# Patient Record
Sex: MALE | Race: BLACK OR AFRICAN AMERICAN | Employment: UNEMPLOYED | URBAN - METROPOLITAN AREA
[De-identification: names, ages, dates, MRNs, and addresses within clinical notes are randomized per-mention and may not be internally consistent; named-entity substitution may affect disease eponyms.]

---

## 2018-05-25 ENCOUNTER — HOSPITAL ENCOUNTER (INPATIENT)
Age: 18
LOS: 3 days | Discharge: HOME | DRG: 881 | End: 2018-05-29
Attending: EMERGENCY MEDICINE | Admitting: PSYCHIATRY & NEUROLOGY
Payer: COMMERCIAL

## 2018-05-25 DIAGNOSIS — F32.A DEPRESSION, UNSPECIFIED DEPRESSION TYPE: Primary | ICD-10-CM

## 2018-05-25 DIAGNOSIS — R45.851 SUICIDAL IDEATION: ICD-10-CM

## 2018-05-25 PROCEDURE — 80307 DRUG TEST PRSMV CHEM ANLYZR: CPT | Performed by: EMERGENCY MEDICINE

## 2018-05-25 PROCEDURE — 99283 EMERGENCY DEPT VISIT LOW MDM: CPT

## 2018-05-25 PROCEDURE — 99282 EMERGENCY DEPT VISIT SF MDM: CPT

## 2018-05-25 PROCEDURE — 85025 COMPLETE CBC W/AUTO DIFF WBC: CPT | Performed by: EMERGENCY MEDICINE

## 2018-05-25 PROCEDURE — 80048 BASIC METABOLIC PNL TOTAL CA: CPT | Performed by: EMERGENCY MEDICINE

## 2018-05-25 NOTE — IP AVS SNAPSHOT
Summary of Care Report The Summary of Care report has been created to help improve care coordination. Users with access to ASCENDANT MDX or [employer]-e[employer] Northeast (Web-based application) may access additional patient information including the Discharge Summary. If you are not currently a SGN (Social Gaming Network) [employer] Northeast user and need more information, please call the number listed below in the [place] [address] section and ask to be connected with Medical Records. Facility Information Name Address Phone [place] [id number] [facility] [address] [phone] Patient Information Patient Name Sex ETHAN Cook ([id number]) Male 2000 Discharge Information Admitting Provider Service Area Unit MD Jeffrey  [facility] [address] Adult Chem Dep / [phone] Discharge Provider Discharge Date/Time Discharge Disposition Destination (none) 2018 (Pending) AHR (none) Patient Language Language ENGLISH [13] Hospital Problems as of 2018  Never Reviewed Class Noted - Resolved Last Modified POA Active Problems Depression  2018 - Present 2018 by MD Jeffrey Unknown Entered by MD Jeffrey  
  
You are allergic to the following No active allergies Current Discharge Medication List  
  
Notice You have not been prescribed any medications. Follow-up Information Follow up With Details Comments Contact Info [employer]  Numbers to remember Fredis Singer [address] Emergency Services [phone] Suicide Prevention [phone](talk) [address] Phone [phone] Discharge Instructions BEHAVIORAL HEALTH NURSING DISCHARGE NOTE The following personal items collected during your admission are returned to you:  
Dental Appliance:   
Vision: Visual Aid: Contacts, With patient Hearing Aid:   
Jewelry:   
Clothing: Clothing: Footwear, Pants, Shirt, Socks, Undergarments Other Valuables: Other Valuables: Cell Phone, Lighter/matches Valuables sent to safe:   
 
 
PATIENT INSTRUCTIONS: 
 
 
Follow-up with TEMO Locke [address] Phone [phone] Numbers to remember [facility] Emergency Services [phone] Suicide Prevention [phone](talk) The discharge information has been reviewed with the patient. The patient verbalized understanding. CombaGroup Activation Thank you for requesting access to CombaGroup. Please follow the instructions below to securely access and download your online medical record. CombaGroup allows you to send messages to your doctor, view your test results, renew your prescriptions, schedule appointments, and more. How Do I Sign Up? 1. In your internet browser, go to [email or web address] 
2. Click on the First Time User? Click Here link in the Sign In box. You will be redirect to the New Member Sign Up page. 3. Enter your CombaGroup Access Code exactly as it appears below. You will not need to use this code after youve completed the sign-up process. If you do not sign up before the expiration date, you must request a new code. CombaGroup Access Code: [email or web address] Expires: 2018 11:12 PM (This is the date your CombaGroup access code will ) 4. Enter the last four digits of your Social Security Number (xxxx) and Date of Birth (mm/dd/yyyy) as indicated and click Submit. You will be taken to the next sign-up page. 5. Create a CombaGroup ID. This will be your CombaGroup login ID and cannot be changed, so think of one that is secure and easy to remember. 6. Create a CombaGroup password. You can change your password at any time. 7. Enter your Password Reset Question and Answer.  This can be used at a later time if you forget your password. 8. Enter your e-mail address. You will receive e-mail notification when new information is available in [address] 9. Click Sign Up. You can now view and download portions of your medical record. 10. Click the Download Summary menu link to download a portable copy of your medical information. Additional Information If you have questions, please visit the Frequently Asked Questions section of the VitalsGuard website at [email or web address] Remember, VitalsGuard is NOT to be used for urgent needs. For medical emergencies, dial 911. Patient armband removed and shredded Chart Review Routing History No Routing History on File

## 2018-05-25 NOTE — IP AVS SNAPSHOT
[address] [address] Patient: Juan Camara MRN: [id number] SD:2/73/5736 About your hospitalization You were admitted on:  May 26, 2018 You last received care in the:  [facility] ADULT CHEM DEP You were discharged on:  May 29, 2018 Why you were hospitalized Your primary diagnosis was:  Not on File Your diagnoses also included:  Depression Follow-up Information Follow up With Details Comments Contact Info [address]  Numbers to remember [address] [place] [address] Emergency Services [phone] Suicide Prevention [phone](talk) [address] Phone [phone] Discharge Orders None A check geovanni indicates which time of day the medication should be taken. My Medications Notice You have not been prescribed any medications. Discharge Instructions BEHAVIORAL HEALTH NURSING DISCHARGE NOTE The following personal items collected during your admission are returned to you:  
Dental Appliance:   
Vision: Visual Aid: Contacts, With patient Hearing Aid:   
Jewelry:   
Clothing: Clothing: Footwear, Pants, Shirt, Socks, Undergarments Other Valuables: Other Valuables: Cell Phone, Lighter/matches Valuables sent to safe:   
 
 
PATIENT INSTRUCTIONS: 
 
 
Follow-up with TEMO Locke [facility]. Phone [phone] Numbers to remember [facility] Desk [place] [address] Emergency Services [phone] Suicide Prevention [phone](talk) The discharge information has been reviewed with the patient. The patient verbalized understanding. EndoLumix Technologyhart Activation Thank you for requesting access to MPV. Please follow the instructions below to securely access and download your online medical record.  MPV allows you to send messages to your doctor, view your test results, renew your prescriptions, schedule appointments, and more. How Do I Sign Up? 1. In your internet browser, go to [email or web address] 
2. Click on the First Time User? Click Here link in the Sign In box. You will be redirect to the New Member Sign Up page. 3. Enter your Pixelligent Access Code exactly as it appears below. You will not need to use this code after youve completed the sign-up process. If you do not sign up before the expiration date, you must request a new code. Pixelligent Access Code: [email or web address] Expires: 2018 11:12 PM (This is the date your Pixelligent access code will ) 4. Enter the last four digits of your Social Security Number (xxxx) and Date of Birth (mm/dd/yyyy) as indicated and click Submit. You will be taken to the next sign-up page. 5. Create a Pixelligent ID. This will be your Pixelligent login ID and cannot be changed, so think of one that is secure and easy to remember. 6. Create a Pixelligent password. You can change your password at any time. 7. Enter your Password Reset Question and Answer. This can be used at a later time if you forget your password. 8. Enter your e-mail address. You will receive e-mail notification when new information is available in [address] 9. Click Sign Up. You can now view and download portions of your medical record. 10. Click the Download Summary menu link to download a portable copy of your medical information. Additional Information If you have questions, please visit the Frequently Asked Questions section of the Pixelligent website at [email or web address] Remember, Pixelligent is NOT to be used for urgent needs. For medical emergencies, dial 911. Patient armband removed and shredded Introducing [facility] & HEALTH SERVICES!    
 [facility] introduces Pixelligent patient portal. Now you can access parts of your medical record, email your doctor's office, and request medication refills online. 1. In your internet browser, go to [email or web address] 2. Click on the First Time User? Click Here link in the Sign In box. You will see the New Member Sign Up page. 3. Enter your PicketReport.com Access Code exactly as it appears below. You will not need to use this code after youve completed the sign-up process. If you do not sign up before the expiration date, you must request a new code. · PicketReport.com Access Code: [email or web address] Expires: 8/23/2018 11:12 PM 
 
4. Enter the last four digits of your Social Security Number (xxxx) and Date of Birth (mm/dd/yyyy) as indicated and click Submit. You will be taken to the next sign-up page. 5. Create a PicketReport.com ID. This will be your PicketReport.com login ID and cannot be changed, so think of one that is secure and easy to remember. 6. Create a PicketReport.com password. You can change your password at any time. 7. Enter your Password Reset Question and Answer. This can be used at a later time if you forget your password. 8. Enter your e-mail address. You will receive e-mail notification when new information is available in [address] 9. Click Sign Up. You can now view and download portions of your medical record. 10. Click the Download Summary menu link to download a portable copy of your medical information. If you have questions, please visit the Frequently Asked Questions section of the PicketReport.com website. Remember, PicketReport.com is NOT to be used for urgent needs. For medical emergencies, dial 911. Now available from your iPhone and Android! Introducing Jens Jorgensen As a [facility] patient, I wanted to make you aware of our electronic visit tool called Jens Jorgensen. [facility] 24/7 allows you to connect within minutes with a medical provider 24 hours a day, seven days a week via a mobile device or tablet or logging into a secure website from your computer. You can access [employer] in [employer] from anywhere in the [place]. A virtual visit might be right for you when you have a simple condition and feel like you just dont want to get out of bed, or cant get away from work for an appointment, when your regular [facility] provider is not available (evenings, weekends or holidays), or when youre out of town and need minor care. Electronic visits cost only $49 and if the BelcherMWM [employer] Workflow Management 24/K94 Discoveries provider determines a prescription is needed to treat your condition, one can be electronically transmitted to a nearby pharmacy*. Please take a moment to enroll today if you have not already done so. The enrollment process is free and takes just a few minutes. To enroll, please download the VoltDB clint to your tablet or phone, or visit [email or web address] to enroll on your computer. And, as an [address] [place] [address] patient with a AdTotum account, the results of your visits will be scanned into your electronic medical record and your primary care provider will be able to view the scanned results. We urge you to continue to see your regular [facility] provider for your ongoing medical care. And while your primary care provider may not be the one available when you seek a Jens Rodgersfin virtual visit, the peace of mind you get from getting a real diagnosis real time can be priceless. For more information on ADTZcatarinafin, view our Frequently Asked Questions (FAQs) at [email or web address]. Sincerely, 
 
Jesse Salomon MD 
Chief Medical Officer [employer] *:  certain medications cannot be prescribed via ADTZcatarinafin Providers Seen During Your Hospitalization Provider Specialty Primary office phone Joe Albert MD Emergency Medicine [phone] Kamilla Braga MD Psychiatry [phone] Your Primary Care Physician (PCP) Primary Care Physician Office Phone Office Fax NONE ** None ** ** None ** You are allergic to the following No active allergies Recent Documentation Height Weight BMI Smoking Status 1.778 m 64.9 kg 20.52 kg/m2 Current Every Day Smoker Emergency Contacts Name Discharge Info Relation Home Work Mobile None,None UNABLE TO CHOOSE [5] Other Relative [6] [phone] Patient Belongings The following personal items are in your possession at time of discharge: 
     Visual Aid: Contacts, With patient      Home Medications: Locked      Clothing: Footwear, Pants, Shirt, Socks, Undergarments    Other Valuables: Cell Phone, Lighter/matches Please provide this summary of care documentation to your next provider. Signatures-by signing, you are acknowledging that this After Visit Summary has been reviewed with you and you have received a copy. Patient Signature:  ____________________________________________________________ Date:  ____________________________________________________________  
  
Kacy Hicks Provider Signature:  ____________________________________________________________ Date:  ____________________________________________________________

## 2018-05-25 NOTE — IP AVS SNAPSHOT
[address] [place] [address] Patient: Flori Sanchez MRN: [id number] [facility]:6/95/3666 A check geovanni indicates which time of day the medication should be taken. My Medications Notice You have not been prescribed any medications.

## 2018-05-26 PROBLEM — F32.A DEPRESSION: Status: ACTIVE | Noted: 2018-05-26

## 2018-05-26 LAB
AMPHET UR QL SCN: NEGATIVE
ANION GAP SERPL CALC-SCNC: 5 MMOL/L (ref 3–18)
BARBITURATES UR QL SCN: NEGATIVE
BASOPHILS # BLD: 0 K/UL (ref 0–0.1)
BASOPHILS NFR BLD: 0 % (ref 0–2)
BENZODIAZ UR QL: NEGATIVE
BUN SERPL-MCNC: 13 MG/DL (ref 7–18)
BUN/CREAT SERPL: 14 (ref 12–20)
CALCIUM SERPL-MCNC: 8.8 MG/DL (ref 8.5–10.1)
CANNABINOIDS UR QL SCN: POSITIVE
CHLORIDE SERPL-SCNC: 104 MMOL/L (ref 100–108)
CO2 SERPL-SCNC: 29 MMOL/L (ref 21–32)
COCAINE UR QL SCN: NEGATIVE
CREAT SERPL-MCNC: 0.91 MG/DL (ref 0.6–1.3)
DIFFERENTIAL METHOD BLD: ABNORMAL
EOSINOPHIL # BLD: 0.2 K/UL (ref 0–0.4)
EOSINOPHIL NFR BLD: 3 % (ref 0–5)
ERYTHROCYTE [DISTWIDTH] IN BLOOD BY AUTOMATED COUNT: 13.5 % (ref 11.6–14.5)
ETHANOL SERPL-MCNC: <3 MG/DL (ref 0–3)
GLUCOSE SERPL-MCNC: 91 MG/DL (ref 74–99)
HCT VFR BLD AUTO: 38.3 % (ref 36–48)
HDSCOM,HDSCOM: ABNORMAL
HGB BLD-MCNC: 13.5 G/DL (ref 13–16)
LYMPHOCYTES # BLD: 2.7 K/UL (ref 0.9–3.6)
LYMPHOCYTES NFR BLD: 32 % (ref 21–52)
MCH RBC QN AUTO: 30.5 PG (ref 24–34)
MCHC RBC AUTO-ENTMCNC: 35.2 G/DL (ref 31–37)
MCV RBC AUTO: 86.5 FL (ref 74–97)
METHADONE UR QL: NEGATIVE
MONOCYTES # BLD: 0.5 K/UL (ref 0.05–1.2)
MONOCYTES NFR BLD: 5 % (ref 3–10)
NEUTS SEG # BLD: 5.1 K/UL (ref 1.8–8)
NEUTS SEG NFR BLD: 60 % (ref 40–73)
OPIATES UR QL: NEGATIVE
PCP UR QL: NEGATIVE
PLATELET # BLD AUTO: 234 K/UL (ref 135–420)
PMV BLD AUTO: 10.3 FL (ref 9.2–11.8)
POTASSIUM SERPL-SCNC: 3.9 MMOL/L (ref 3.5–5.5)
RBC # BLD AUTO: 4.43 M/UL (ref 4.7–5.5)
SODIUM SERPL-SCNC: 138 MMOL/L (ref 136–145)
WBC # BLD AUTO: 8.5 K/UL (ref 4.6–13.2)

## 2018-05-26 PROCEDURE — 65220000003 HC RM SEMIPRIVATE PSYCH

## 2018-05-26 RX ORDER — LORAZEPAM 2 MG/ML
2 INJECTION INTRAMUSCULAR
Status: DISCONTINUED | OUTPATIENT
Start: 2018-05-26 | End: 2018-05-29 | Stop reason: HOSPADM

## 2018-05-26 RX ORDER — LORAZEPAM 1 MG/1
1 TABLET ORAL
Status: DISCONTINUED | OUTPATIENT
Start: 2018-05-26 | End: 2018-05-29 | Stop reason: HOSPADM

## 2018-05-26 RX ORDER — BENZTROPINE MESYLATE 1 MG/ML
1-2 INJECTION INTRAMUSCULAR; INTRAVENOUS
Status: DISCONTINUED | OUTPATIENT
Start: 2018-05-26 | End: 2018-05-29 | Stop reason: HOSPADM

## 2018-05-26 RX ORDER — TRAZODONE HYDROCHLORIDE 50 MG/1
50 TABLET ORAL
Status: DISCONTINUED | OUTPATIENT
Start: 2018-05-26 | End: 2018-05-29 | Stop reason: HOSPADM

## 2018-05-26 RX ORDER — LORAZEPAM 2 MG/ML
1 INJECTION INTRAMUSCULAR
Status: DISCONTINUED | OUTPATIENT
Start: 2018-05-26 | End: 2018-05-29 | Stop reason: HOSPADM

## 2018-05-26 RX ORDER — HALOPERIDOL 5 MG/ML
5 INJECTION INTRAMUSCULAR
Status: DISCONTINUED | OUTPATIENT
Start: 2018-05-26 | End: 2018-05-29 | Stop reason: HOSPADM

## 2018-05-26 RX ORDER — HALOPERIDOL 5 MG/1
5 TABLET ORAL
Status: DISCONTINUED | OUTPATIENT
Start: 2018-05-26 | End: 2018-05-29 | Stop reason: HOSPADM

## 2018-05-26 RX ORDER — IBUPROFEN 400 MG/1
400 TABLET ORAL
Status: DISCONTINUED | OUTPATIENT
Start: 2018-05-26 | End: 2018-05-29 | Stop reason: HOSPADM

## 2018-05-26 RX ORDER — BENZTROPINE MESYLATE 1 MG/1
1-2 TABLET ORAL
Status: DISCONTINUED | OUTPATIENT
Start: 2018-05-26 | End: 2018-05-29 | Stop reason: HOSPADM

## 2018-05-26 RX ORDER — LORAZEPAM 1 MG/1
2 TABLET ORAL
Status: DISCONTINUED | OUTPATIENT
Start: 2018-05-26 | End: 2018-05-29 | Stop reason: HOSPADM

## 2018-05-26 NOTE — H&P
History and Physical        Patient: Ana Paula Garza               Sex: male          DOA: 5/25/2018         YOB: 2000      Age:  19 y.o.        LOS:  LOS: 0 days        HPI:     Ana Paula Garza is a 19 y.o. male who was admitted under TDO experiencing out of control behavior, depression and suicidal ideation. Active Problems:    Depression (5/26/2018)        History reviewed. No pertinent past medical history. History reviewed. No pertinent surgical history. History reviewed. No pertinent family history. Social History     Social History    Marital status: SINGLE     Spouse name: N/A    Number of children: NONE    Years of education: 11     Social History Main Topics    Smoking status: Current Every Day Smoker    Smokeless tobacco: Never Used      Comment: black and mild    Alcohol use Yes    Drug use: Yes     Special: Marijuana    Sexual activity: Not Asked     Other Topics Concern    None     Social History Narrative   Lives with his girl friend. Appetite and sleep okay. Unemployed. Prior to Admission medications    Not on File       No Known Allergies    Review of Systems  A comprehensive review of systems was negative. Physical Exam:      Visit Vitals    /63 (BP 1 Location: Right arm, BP Patient Position: Sitting)    Pulse (!) 53    Temp 97.6 °F (36.4 °C)    Resp 16    Ht 5' 10\" (1.778 m)    Wt 143 lb (64.9 kg)    SpO2 100%    BMI 20.52 kg/m2       Physical Exam:    General:  Alert, cooperative, well developed, well nourished AA male, no distress, appears stated age. Eyes:  Conjunctivae/corneas clear. PERRL, EOMs intact. Fundi benign   Ears:  Normal TMs and external ear canals both ears. Nose: Nares normal. Septum midline. Mucosa normal. No drainage or sinus tenderness.    Mouth/Throat: Lips, mucosa, and tongue normal. Teeth and gums normal.   Neck: Supple, symmetrical, trachea midline, no adenopathy, thyroid: no enlargement/tenderness/nodules, no carotid bruit and no JVD. Back:   Symmetric, no curvature. ROM normal. No CVA tenderness. Lungs:   Clear to auscultation bilaterally. Heart:  Regular rate and rhythm, S1, S2 normal, no murmur, click, rub or gallop. Abdomen:   Soft, non-tender. Bowel sounds normal. No masses,  No organomegaly. Extremities: Extremities normal, atraumatic, no cyanosis or edema. Pulses: 2+ and symmetric all extremities. Skin: Skin color, texture, turgor normal. No rashes or lesions   Lymph nodes: Cervical, supraclavicular, and axillary nodes normal.   Neurologic: CNII-XII intact. Normal strength, sensation and reflexes throughout.            Assessment/Plan     Depression  Suicidal ideation  Out of control behavior  Labs reviewed (+THC)  Treatment per physician's orders

## 2018-05-26 NOTE — BH NOTES
Patient presented to nurses' station anxious and irritated, and demanded (without yelling) that he get his cell phone and numbers. Stated, \"I'm being held here against my will\" and claimed that he was tricked into being brought into the ED for a voluntary entrance. Patient denied ever having SI and self harm. Stated, \"these are cat scratches\" in regard the LFA. Staff attempted reinforce education regarding the TDO process and unit rules regarding personal cell phone use/obtaining numbers; education not received well by patient. Supervisor presence was requested by patient; given both patient advocate number and supervisor notified. Crisis supervisor was notified and discussed information as well; education was not well received. Patient continued to demand his phone and belongings, then threatened to jump over the counter of the nurses' station to get them. The alarm was pressed for assistance and security. Patient was able to be de-escalated verbally; no medication received.

## 2018-05-26 NOTE — BH NOTES
Patient admitted to [address] Unit; report given to Rylie Cornejo RN from [facility] 28. Patient irritable at beginning of admission assessment, then was polite and appropriate during rest of assessment. Patient states he has issues with girlfriend, and he admits he keeps his anger inside and then he made superficial cuts left forearm (healing, no drainage) as a way to let the anger out that he had inside. Patient states he does not want to hurt himself in the hospital.   Patient ate a snack and then went on to bed. Declined prn medication when offered.

## 2018-05-26 NOTE — PROGRESS NOTES
Problem: Suicide/Homicide (Adult/Pediatric)  Goal: *STG: Attends activities and groups  Patient will attend at least 2 groups/activities every day throughout hospital stay. Outcome: Not Progressing Towards Goal  aeb patient reports \" I dont need to be here. \"  Goal: *STG/LTG: Complies with medication therapy  Patient will take prescribed medications as ordered every day throughout hospital stay. Outcome: Progressing Towards Goal  aeb no medications due at this time and patient aware    Comments: Patient is alert and oriented x 3. He has been irritable and upset and asking to leave the hospital. He asked for his phone to obtain numbers and then did not want to give the phone back. He remains irritable but was compliant in giving up phone and staying calm. Denies suicidal and homicidal ideations. No hallucinations. Staff continues to monitor for safety, redirect as needed and provide a supportive environment.

## 2018-05-26 NOTE — H&P
[address] Services    Admission Note      Patient:  Adrianne Mcgee Age:  19 y.o. :  2000     SEX:  male MRN:  [id number] CSN:  [id number]    2018  5:32 PM    Informants and Patient Contacts: Praveen    Voluntary: No    Identifying Data and Chief Compliant: \" I want to go home'     History of Present Illness: Adrianne Mcgee is a 19 y.o. male who was admitted under TDO experiencing out of control behavior, depression and suicidal ideation. Patient minizes his recent behavior. He states he had an argument  With his GF and said that  Was leaving which was misconstrued as SI. However he does admit to cutting himself. He is irritable and testing boundaries. He denies previous psych treatment. UDS positive for MJ. He does not want to take any medications. Past Psychiatric/Medical History: History reviewed. No pertinent past medical history. Substance Use History: Marijuana: Marijuana  Age First Got High: 11  Pattern of Use in Past Year:  (now and then)  Date of Last Use: 18  Are you able to stop on your own: Yes    Allergies: No Known Allergies    Prior to admission medications: No prescriptions prior to admission.        Current medications:   Current Facility-Administered Medications   Medication Dose Route Frequency    benztropine (COGENTIN) injection 1-2 mg  1-2 mg IntraMUSCular Q4H PRN    benztropine (COGENTIN) tablet 1-2 mg  1-2 mg Oral Q4H PRN    LORazepam (ATIVAN) injection 1 mg  1 mg IntraMUSCular Q4H PRN    LORazepam (ATIVAN) injection 2 mg  2 mg IntraMUSCular Q4H PRN    traZODone (DESYREL) tablet 50 mg  50 mg Oral QHS PRN    haloperidol (HALDOL) tablet 5 mg  5 mg Oral Q4H PRN    haloperidol lactate (HALDOL) injection 5 mg  5 mg IntraMUSCular Q4H PRN    LORazepam (ATIVAN) tablet 1 mg  1 mg Oral Q4H PRN    LORazepam (ATIVAN) tablet 2 mg  2 mg Oral Q4H PRN    ibuprofen (MOTRIN) tablet 400 mg  400 mg Oral Q6H PRN       Outpatient Physicians:    None  Review of Systems  positive for behavior problems    Family History of psychiatric and medical illness and substance abuse  History reviewed. No pertinent family history. Personal History    Lives with GF. Denies having any children.        Mental Status Exam      Appearance    General Behavior   Irritable      Speech form and content,  Language  Associations  Form of Thought   Normal flow and volume  TP : Logical, goal oriented   Mood, Affect  Self-Attitude  Vital Sense  SI/HI/PDW   Irritable  No SI, HI, hopelessness   Abnormal Perceptions and illusions   Denies     Delusions   None   Anxiety    Denies   COGNITION Intelligence Abstraction   Intact   Judgement Insight    Poor       Data/Labs/Vital Signs    Patient Vitals for the past 24 hrs:   BP Temp Pulse Resp SpO2 Height Weight   05/26/18 0746 104/63 97.6 °F (36.4 °C) (!) 53 16 - - -   05/26/18 0325 118/77 96.8 °F (36 °C) 77 16 - - -   05/26/18 0217 126/74 98 °F (36.7 °C) 65 16 100 % - -   05/25/18 2315 120/81 97.1 °F (36.2 °C) (!) 56 17 (!) 56 % 5' 10\" (1.778 m) 64.9 kg (143 lb)       Recent Results (from the past 24 hour(s))   DRUG SCREEN, URINE    Collection Time: 05/25/18 11:58 PM   Result Value Ref Range    BENZODIAZEPINES NEGATIVE  NEG      BARBITURATES NEGATIVE  NEG      THC (TH-CANNABINOL) POSITIVE (A) NEG      OPIATES NEGATIVE  NEG      PCP(PHENCYCLIDINE) NEGATIVE  NEG      COCAINE NEGATIVE  NEG      AMPHETAMINES NEGATIVE  NEG      METHADONE NEGATIVE  NEG      HDSCOM (NOTE)    ETHYL ALCOHOL    Collection Time: 05/25/18 11:58 PM   Result Value Ref Range    ALCOHOL(ETHYL),SERUM <3 0 - 3 MG/DL   CBC WITH AUTOMATED DIFF    Collection Time: 05/25/18 11:58 PM   Result Value Ref Range    WBC 8.5 4.6 - 13.2 K/uL    RBC 4.43 (L) 4.70 - 5.50 M/uL    HGB 13.5 13.0 - 16.0 g/dL    HCT 38.3 36.0 - 48.0 %    MCV 86.5 74.0 - 97.0 FL    MCH 30.5 24.0 - 34.0 PG    MCHC 35.2 31.0 - 37.0 g/dL    RDW 13.5 11.6 - 14.5 %    PLATELET 349 064 - 456 K/uL MPV 10.3 9.2 - 11.8 FL    NEUTROPHILS 60 40 - 73 %    LYMPHOCYTES 32 21 - 52 %    MONOCYTES 5 3 - 10 %    EOSINOPHILS 3 0 - 5 %    BASOPHILS 0 0 - 2 %    ABS. NEUTROPHILS 5.1 1.8 - 8.0 K/UL    ABS. LYMPHOCYTES 2.7 0.9 - 3.6 K/UL    ABS. MONOCYTES 0.5 0.05 - 1.2 K/UL    ABS. EOSINOPHILS 0.2 0.0 - 0.4 K/UL    ABS. BASOPHILS 0.0 0.0 - 0.1 K/UL    DF AUTOMATED     METABOLIC PANEL, BASIC    Collection Time: 05/25/18 11:58 PM   Result Value Ref Range    Sodium 138 136 - 145 mmol/L    Potassium 3.9 3.5 - 5.5 mmol/L    Chloride 104 100 - 108 mmol/L    CO2 29 21 - 32 mmol/L    Anion gap 5 3.0 - 18 mmol/L    Glucose 91 74 - 99 mg/dL    BUN 13 7.0 - 18 MG/DL    Creatinine 0.91 0.6 - 1.3 MG/DL    BUN/Creatinine ratio 14 12 - 20      GFR est AA >60 >60 ml/min/1.73m2    GFR est non-AA >60 >60 ml/min/1.73m2    Calcium 8.8 8.5 - 10.1 MG/DL       Axis I: Mood Disorder NOS  Axis II: No diagnosis  Axis III: History reviewed. No pertinent past medical history. Axis IV: Problems with primary support group  Axis V: 21-30 behavior considerably influenced by delusions or hallucinations OR serious impairment in judgment, communication OR inability to function in almost all areas      Recommendations/Plan  -Admit   -Psychosocial assessment and substance abuse counseling  -Somatic evaluation and tx Cos  -Begin disposition plannining  ·            I certify that this patient's inpatient psychiatric hospital services furnished since the previous certification were, and continue to be, required for treatment that could reasonably be expected to improve the patient's condition, or for diagnostic study, and that the patient continues to need, on a daily basis, active treatment furnished directly by or requiring the supervision of inpatient psychiatric facility personnel. In addition the hospital records show that services furnished were intensive treatment services, admission or related services, or equivalent services.     [facility], MD 5/26/2018 5:32 PM

## 2018-05-26 NOTE — ED NOTES
Report called to Radha Lennon on adult unit,  Behavioral health unit. Report included,  History, current status, and vitals.

## 2018-05-26 NOTE — ED PROVIDER NOTES
EMERGENCY DEPARTMENT HISTORY AND PHYSICAL EXAM    11:56 PM      Date: 5/25/2018  Patient Name: Adrianne Mcgee    History of Presenting Illness     Chief Complaint   Patient presents with    Suicidal         History Provided By: Patient and PPD    Chief Complaint: SI  Duration:  Minutes, just before arrival   Timing:  Acute  Location: Psychiatric  Quality: N/A  Severity: Moderate  Modifying Factors: N/A  Associated Symptoms: laceration to left forearm      Additional History (Context): Adrianne Mcgee is a 19 y.o. male with No significant past medical history who presents for evaluation of SI with acute onset today, just minutes before arrival. PPD notes that they received a suicide attempt call. PPD states that patient had cut forearms when they arrived and that he has two small lacerations to his left forearm. PPD reports that patient \"has scars up and down his arm. \" They note that they attempted to get patient to report to the ED voluntarily, but he did not agree. Patient states that he was attempting to kill himself and get pain relief from the lacerations on his arm. He notes that he would currently not attempt suicide and has never attempted suicide. Patient reports that after \"19 and a half years of dealing with stuff,\" he \"might as well end it. \" He notes having insomnia and reports smoking marijuana to help. He states that he is homeless. Patient notes that he smokes cigarettes sometimes. PCP: None      Past History     Past Medical History:  History reviewed. No pertinent past medical history. Past Surgical History:  History reviewed. No pertinent surgical history. Family History:  History reviewed. No pertinent family history.     Social History:  Social History   Substance Use Topics    Smoking status: Current Every Day Smoker    Smokeless tobacco: Never Used      Comment: black and mild    Alcohol use Yes       Allergies:  No Known Allergies      Review of Systems       Review of Systems Constitutional: Negative for fever. Respiratory: Negative for chest tightness. Cardiovascular: Negative for chest pain. Skin:        +2 small lacerations to left forearm   Psychiatric/Behavioral: Positive for self-injury, sleep disturbance and suicidal ideas. All other systems reviewed and are negative. Physical Exam     Visit Vitals    /81 (BP 1 Location: Left arm)    Pulse (!) 56    Temp 97.1 °F (36.2 °C)    Resp 17    Wt 64.9 kg (143 lb)    SpO2 (!) 56%         Physical Exam   Constitutional: He is oriented to person, place, and time. He appears well-developed and well-nourished. HENT:   Head: Normocephalic and atraumatic. Eyes: Conjunctivae and EOM are normal. Pupils are equal, round, and reactive to light. Neck: Normal range of motion. Neck supple. Cardiovascular: Normal rate and regular rhythm. Pulmonary/Chest: Effort normal and breath sounds normal.   Abdominal: Soft. Bowel sounds are normal.   Musculoskeletal: Normal range of motion. Neurological: He is alert and oriented to person, place, and time. He has normal reflexes. Skin: Skin is warm and dry. Superficial scratches to left forearm, no closure indicated, no sign of infection   Psychiatric: His speech is delayed. He is withdrawn. He expresses impulsivity. He exhibits a depressed mood. He expresses suicidal ideation. He expresses suicidal plans. Nursing note and vitals reviewed.         Diagnostic Study Results     Labs -  Recent Results (from the past 12 hour(s))   DRUG SCREEN, URINE    Collection Time: 05/25/18 11:58 PM   Result Value Ref Range    BENZODIAZEPINES NEGATIVE  NEG      BARBITURATES NEGATIVE  NEG      THC (TH-CANNABINOL) POSITIVE (A) NEG      OPIATES NEGATIVE  NEG      PCP(PHENCYCLIDINE) NEGATIVE  NEG      COCAINE NEGATIVE  NEG      AMPHETAMINES NEGATIVE  NEG      METHADONE NEGATIVE  NEG      HDSCOM (NOTE)    ETHYL ALCOHOL    Collection Time: 05/25/18 11:58 PM   Result Value Ref Range ALCOHOL(ETHYL),SERUM <3 0 - 3 MG/DL   CBC WITH AUTOMATED DIFF    Collection Time: 05/25/18 11:58 PM   Result Value Ref Range    WBC 8.5 4.6 - 13.2 K/uL    RBC 4.43 (L) 4.70 - 5.50 M/uL    HGB 13.5 13.0 - 16.0 g/dL    HCT 38.3 36.0 - 48.0 %    MCV 86.5 74.0 - 97.0 FL    MCH 30.5 24.0 - 34.0 PG    MCHC 35.2 31.0 - 37.0 g/dL    RDW 13.5 11.6 - 14.5 %    PLATELET 144 893 - 364 K/uL    MPV 10.3 9.2 - 11.8 FL    NEUTROPHILS 60 40 - 73 %    LYMPHOCYTES 32 21 - 52 %    MONOCYTES 5 3 - 10 %    EOSINOPHILS 3 0 - 5 %    BASOPHILS 0 0 - 2 %    ABS. NEUTROPHILS 5.1 1.8 - 8.0 K/UL    ABS. LYMPHOCYTES 2.7 0.9 - 3.6 K/UL    ABS. MONOCYTES 0.5 0.05 - 1.2 K/UL    ABS. EOSINOPHILS 0.2 0.0 - 0.4 K/UL    ABS. BASOPHILS 0.0 0.0 - 0.1 K/UL    DF AUTOMATED     METABOLIC PANEL, BASIC    Collection Time: 05/25/18 11:58 PM   Result Value Ref Range    Sodium 138 136 - 145 mmol/L    Potassium 3.9 3.5 - 5.5 mmol/L    Chloride 104 100 - 108 mmol/L    CO2 29 21 - 32 mmol/L    Anion gap 5 3.0 - 18 mmol/L    Glucose 91 74 - 99 mg/dL    BUN 13 7.0 - 18 MG/DL    Creatinine 0.91 0.6 - 1.3 MG/DL    BUN/Creatinine ratio 14 12 - 20      GFR est AA >60 >60 ml/min/1.73m2    GFR est non-AA >60 >60 ml/min/1.73m2    Calcium 8.8 8.5 - 10.1 MG/DL       Radiologic Studies -   No orders to display         Medical Decision Making   I am the first provider for this patient. I reviewed the vital signs, available nursing notes, past medical history, past surgical history, family history and social history. Vital Signs-Reviewed the patient's vital signs. Records Reviewed: Nursing Notes and Old Medical Records (Time of Review: 11:56 PM)    ED Course: Progress Notes, Reevaluation, and Consults:    Crisis rn consultation    Provider Notes (Medical Decision Making):     Pt was an ECO and then made a TDO, then was evaluated by crisis and per Manuela with crisis pt will be admitted to inpatient mh unit        Diagnosis     Clinical Impression:   1.  Depression, unspecified depression type    2. Suicidal ideation        Disposition:   Admitted to inpatient  unit        _______________________________    Attestations:  Florencia [address] acting as a scribe for and in the presence of Harish Malone NP      May 25, 2018 at 11:56 PM       Provider Attestation:      I personally performed the services described in the documentation, reviewed the documentation, as recorded by the scribe in my presence, and it accurately and completely records my words and actions.  May 25, 2018 at 11:56 PM - Harish Malone NP   _______________________________    Meera PATEL, JILLIANPDamienC

## 2018-05-26 NOTE — ED TRIAGE NOTES
Patient brought in as eco.  Pt states he became agitated after not being able to see his girlfriend for 3 weeks. Pt states he tried to hurt himself. Pt states he cut self with knife. Pt has cuts to left forearm. Pt states he does not currently have thoughts of hurting self.

## 2018-05-26 NOTE — BSMART NOTE
Client requested to speak to supervisor after calling state HR advocate line. States he is demanding to have all his possessions returned to him and be let out immediately. \" I am being held against my will. \"  Client is admitted on TDO. Obtained copy of order for client and attempted to explain process. Client states he must leave to go to work and retrieve his possessions from his girlfriend's house where he lives. States girlfriend & her friend must have lied to get the police to bring him to hospital. Denied he was suicidal at that time. Superficial lacerations are denied at being a suicide attempt, \"They misunderstood when I said I was going to leave. \"  Client continued to demand to leave, states he is going to jump over nurse's station and take his things and leave.

## 2018-05-27 PROCEDURE — 74011250637 HC RX REV CODE- 250/637: Performed by: PSYCHIATRY & NEUROLOGY

## 2018-05-27 PROCEDURE — 65220000003 HC RM SEMIPRIVATE PSYCH

## 2018-05-27 RX ORDER — ESCITALOPRAM OXALATE 10 MG/1
10 TABLET ORAL EVERY EVENING
Status: DISCONTINUED | OUTPATIENT
Start: 2018-05-27 | End: 2018-05-29 | Stop reason: HOSPADM

## 2018-05-27 RX ADMIN — ESCITALOPRAM OXALATE 10 MG: 10 TABLET ORAL at 17:13

## 2018-05-27 NOTE — BH NOTES
JUSTYNA Notes: pt has been compliant on shift than previous days. Pt has been interacting with peers in milieu and selective staff on shift. Pt tolearated meals on shift. Pt was seen utilizing the phone. Pt was not a behavior issue on shift and will continue to be monitored for safety precautions and locations.

## 2018-05-27 NOTE — BH NOTES
GROUP THERAPY PROGRESS NOTE    Guicho Kemp is participating in [place].      Group time: 30 minutes    Personal goal for participation: have a good day    Goal orientation: community    Group therapy participation: minimal    Therapeutic interventions reviewed and discussed: goals and procedures    Impression of participation: encouraged

## 2018-05-27 NOTE — PROGRESS NOTES
[address]     Physician Daily Progress Note    Patient:  Esequiel Vasquez Age:  19 y.o. :  2000     SEX:  male MRN:  [id number] CSN:  [id number]    Admit Date:  2018 Attending:  Judie Mckenzie MD    Subjective: Javier Gilmore a 19 y. o. male who was admitted under TDO experiencing out of control behavior, depression and suicidal ideation. Patient minizes his recent behavior. He states he had an argument  With his GF and said that  Was leaving which was misconstrued as SI. However he does admit to cutting himself. He is irritable and testing boundaries. He denies previous psych treatment. UDS positive for MJ. He had a verbal altercation with staff yesterday over his phone. He admits to periods of low mood and irritability. Discussed starting Lexapro.       Current Medications:    Current Facility-Administered Medications   Medication Dose Route Frequency Provider Last Rate Last Dose    benztropine (COGENTIN) injection 1-2 mg  1-2 mg IntraMUSCular Q4H PRANTONIO Mueller MD        benztropine (COGENTIN) tablet 1-2 mg  1-2 mg Oral Q4H PRANTONIO Mueller MD        LORazepam (ATIVAN) injection 1 mg  1 mg IntraMUSCular Q4H PRANTONIO Mueller MD        LORazepam (ATIVAN) injection 2 mg  2 mg IntraMUSCular Q4H PRANTONIO Mueller MD        traZODone (DESYREL) tablet 50 mg  50 mg Oral QHS PRANOTNIO Mueller MD        haloperidol (HALDOL) tablet 5 mg  5 mg Oral Q4H PRANTONIO Mueller MD        haloperidol lactate (HALDOL) injection 5 mg  5 mg IntraMUSCular Q4H PRANTONIO Mueller MD        LORazepam (ATIVAN) tablet 1 mg  1 mg Oral Q4H PRANTONIO Mueller MD        LORazepam (ATIVAN) tablet 2 mg  2 mg Oral Q4H PRANTONIO Mueller MD        ibuprofen (MOTRIN) tablet 400 mg  400 mg Oral Q6H PRN Abiodun Mueller MD           Compliant with medication:  Yes      Side effects from medications:  No     Mental Status Exam    Appearance    General Behavior   Irritable      Speech form and content,  Language  Associations  Form of Thought   Normal flow and volume  TP : Logical, goal oriented   Mood, Affect  Self-Attitude  Vital Sense  SI/HI/PDW   Irritable  No SI, HI, hopelessness   Abnormal Perceptions and illusions   Denies     Delusions   None   Anxiety    Denies   COGNITION Intelligence Abstraction   Intact   Judgement Insight    Poor          Diagnoses/Impressions:     Psychiatric:    Active Problems:    Depression (5/26/2018) POA: Unknown            Medical:     Visit Vitals    /65 (BP 1 Location: Right arm, BP Patient Position: Sitting)    Pulse 62    Temp 97.5 °F (36.4 °C)    Resp 18    Ht 5' 10\" (1.778 m)    Wt 64.9 kg (143 lb)    SpO2 100%    BMI 20.52 kg/m2       No lab exists for component: 24H    Recommendations/Plan:      []  Dangerous and will not contract for safety in the community    []  Response to medications is not adequate    []  Appropriate disposition not finalized    []  Collateral history needed to determine safety    []  Continue current medications and follow MSE for sxs improvement    [x]  Medication changes as follows:     []  Continue to build rapport    [x]  Supportive psychotherapy    [x]  Insight oriented therapy    [x]  Group attendance/processing    [x]  Relapse prevention      [x]  Somatic Management    [x]  Disposition planning                                   Abiodun Gerardo MD               5/27/2018          9:06 AM

## 2018-05-27 NOTE — BH NOTES
GROUP THERAPY PROGRESS NOTE    Ayana Schmidt is participating in Recreational Group     Group time: 1hr    Personal goal for participation: Social interaction    Goal orientation: Social    Group therapy participation: active    Therapeutic interventions reviewed and discussed: Positive social interaction and recreational activity to reduce stress/anxiety    Impression of participation: Pt. present and attentive during group while interacting positively with peers

## 2018-05-27 NOTE — BH NOTES
Patient has been in room since 2000. Patient has not been a behavioral concern throughout the rest of the shift. He has demonstrated appropriate and positive behavior during interactions with staff and peers. Patient has not received any medications. No reports of SI/HI and AVH. Patient has not engaged in any hazardous activities that may result in a fall or injury to self/others.

## 2018-05-28 VITALS
OXYGEN SATURATION: 100 % | WEIGHT: 143 LBS | RESPIRATION RATE: 18 BRPM | BODY MASS INDEX: 20.47 KG/M2 | SYSTOLIC BLOOD PRESSURE: 130 MMHG | HEIGHT: 70 IN | DIASTOLIC BLOOD PRESSURE: 62 MMHG | HEART RATE: 65 BPM | TEMPERATURE: 99.8 F

## 2018-05-28 PROCEDURE — 74011250637 HC RX REV CODE- 250/637: Performed by: PSYCHIATRY & NEUROLOGY

## 2018-05-28 PROCEDURE — 65220000003 HC RM SEMIPRIVATE PSYCH

## 2018-05-28 RX ADMIN — ESCITALOPRAM OXALATE 10 MG: 10 TABLET ORAL at 17:05

## 2018-05-28 NOTE — BH NOTES
GROUP THERAPY PROGRESS NOTE    Ericka Kussmaul is participating in [place]. Group time: 15 minutes    Personal goal for participation: discussed treatment/guidelines/discharge    Goal orientation: personal    Group therapy participation: minimal    Therapeutic interventions reviewed and discussed: Encouraged to participate in treatment while here and goals for discharge. Be medication compliant and talk with doctor regarding any issues. Impression of participation: Pt sat quietly in group without any complaints.

## 2018-05-28 NOTE — PROGRESS NOTES
Problem: Suicide/Homicide (Adult/Pediatric)  Goal: *STG/LTG: Complies with medication therapy  Patient will take prescribed medications as ordered every day throughout hospital stay. Outcome: Progressing Towards Goal  aeb no unsafe behaviors observed    Problem: Falls - Risk of  Goal: *Absence of Falls  Document Kristi Fall Risk and appropriate interventions in the flowsheet. Outcome: Progressing Towards Goal  Fall Risk Interventions:            Medication Interventions: Evaluate medications/consider consulting pharmacy                  Comments: Patient is alert and oriented x 3. Cooperative and irritable. Reports he is not suppose to be here. Denies suicidal and homicidal ideations. No hallucinations. Staff continues to monitor for safety and provide a supportive environment.

## 2018-05-28 NOTE — PROGRESS NOTES
[place] [id number]     Physician Daily Progress Note    Patient:  Jared Zepeda Age:  19 y.o. :  2000     SEX:  male MRN:  [id number] CSN:  [id number]    Admit Date:  2018 Attending:  Josesito Florian MD    Subjective: Margaux Jackson a 19 y. o. male who was admitted under TDO experiencing out of control behavior, depression and suicidal ideation. He is much calmer today Patient minizes his recent behavior. He states he had an argument  With his GF and said that  Was leaving which was misconstrued as SI. However he does admit to cutting himself. He is irritable and testing boundaries. He denies previous psych treatment. UDS positive for MJ. He had a verbal altercation with staff yesterday over his phone. He admits to periods of low mood and irritability. He is accepting  Lexapro.     Current Medications:    Current Facility-Administered Medications   Medication Dose Route Frequency Provider Last Rate Last Dose    escitalopram oxalate (LEXAPRO) tablet 10 mg  10 mg Oral QPM Abiodun Carlson MD   10 mg at 18 1713    benztropine (COGENTIN) injection 1-2 mg  1-2 mg IntraMUSCular Q4H PRN Abiodun Carlson MD        benztropine (COGENTIN) tablet 1-2 mg  1-2 mg Oral Q4H PRN Abiodun Carlson MD        LORazepam (ATIVAN) injection 1 mg  1 mg IntraMUSCular Q4H PRN Abiodun Carlson MD        LORazepam (ATIVAN) injection 2 mg  2 mg IntraMUSCular Q4H PRN Abiodun Carlson MD        traZODone (DESYREL) tablet 50 mg  50 mg Oral QHS PRN Abiodun Carlson MD        haloperidol (HALDOL) tablet 5 mg  5 mg Oral Q4H PRN Abiodun Carlson MD        haloperidol lactate (HALDOL) injection 5 mg  5 mg IntraMUSCular Q4H PRN Abiodun Carlson MD        LORazepam (ATIVAN) tablet 1 mg  1 mg Oral Q4H PRN Abiodun Carlson MD        LORazepam (ATIVAN) tablet 2 mg  2 mg Oral Q4H PRN Abiodun Carlson MD        ibuprofen (MOTRIN) tablet 400 mg  400 mg Oral Q6H PRN Abiodun Carlson MD Compliant with medication:  Yes      Side effects from medications:  No     Mental Status Exam    Appearance    General Behavior   Irritable      Speech form and content,  Language  Associations  Form of Thought   Normal flow and volume  TP : Logical, goal oriented   Mood, Affect  Self-Attitude  Vital Sense  SI/HI/PDW   Irritable  No SI, HI, hopelessness   Abnormal Perceptions and illusions   Denies     Delusions   None   Anxiety    Denies   COGNITION Intelligence Abstraction   Intact   Judgement Insight    Poor          Diagnoses/Impressions:      Psychiatric:    Active Problems:    Depression (5/26/2018) POA: Unknown            Medical:     Visit Vitals    /62 (BP 1 Location: Right arm, BP Patient Position: Sitting)    Pulse 65    Temp 99.8 °F (37.7 °C)    Resp 18    Ht 5' 10\" (1.778 m)    Wt 64.9 kg (143 lb)    SpO2 100%    BMI 20.52 kg/m2       No lab exists for component: 24H    Recommendations/Plan:      []  Dangerous and will not contract for safety in the community    []  Response to medications is not adequate    []  Appropriate disposition not finalized    []  Collateral history needed to determine safety    [x]  Continue current medications and follow MSE for sxs improvement    []  Medication changes as follows:     [x]  Continue to build rapport    [x]  Supportive psychotherapy    [x]  Insight oriented therapy    [x]  Group attendance/processing    [x]  Relapse prevention      [x]  Somatic Management    [x]  Disposition planning                                   Abiodun Alonzo MD               5/28/2018          12:04 PM

## 2018-05-28 NOTE — BH NOTES
GROUP THERAPY PROGRESS NOTE    Em Canchola is participating in Stress Management.      Group time: 30 minutes    Personal goal for participation: Coping with stress    Goal orientation: social    Group therapy participation: active    Therapeutic interventions reviewed and discussed: How to deal with stress    Impression of participation: Patient has fully participated
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
## 2018-05-29 NOTE — BH NOTES
Observed patient's behavior throughout the shift. Patient has eaten all meals, snacks, participated in group session (Stress Management), and interacts with other peers. Patient has played games, colored pictures and fixed puzzles throughout the shift and patient is never a problem, but loves to talk. Will continue to monitor patient's behavior and safety for the duration of the shift.

## 2018-05-29 NOTE — BH NOTES
Discharge instructions reviewed and given to pt. No RXs given or appt made due to being released by court. Discharged to home with a bus ticket per protocol.

## 2018-05-29 NOTE — DISCHARGE SUMMARY
[facility]  Inpatient Psychiatry   Discharge Summary     Admit date: 5/25/2018    Discharge date and time: 5/29/2018 10:19 AM    Discharge Physician: Juan Garay MD    DISCHARGE DIAGNOSES     Psychiatric Diagnoses:   Patient Active Problem List   Diagnosis Code    Depression F32.9       Level of impairment/disability: Mild to moderate     HOSPITAL COURSE     Shellie Sellers a 19 y. o. male who was admitted under TDO experiencing out of control behavior, depression and suicidal ideation. Patient minimizes his recent behavior. He states he had an argument  With his GF and said that  Was leaving which was misconstrued as SI. However he does admit to cutting himself. He is irritable and testing boundaries. He denies previous psych treatment. UDS positive for MJ. He does not want to take any medications. The pt was not a behavioral concern while hospitalized. He attended groups, was medication compliant and behaviorally appropriate. Pt denied medication side effects including TD, EPS, akathisia and mood derangements. On 5/29/2018 the pt was deemed psychiatrically stable and discharged to home. The pt denied SI, HI and AVH. DISPOSITION/FOLLOW-UP     Disposition: home    Follow-up Appointments:  No follow-up appts made as pt was released from psychiatric hospitalization by court. MEDICATION CHANGES   Outpatient medications:  No current facility-administered medications on file prior to encounter. No current outpatient prescriptions on file prior to encounter.          Medications discontinued during hospitalization:  Medications Discontinued During This Encounter   Medication Reason    escitalopram oxalate (LEXAPRO) tablet 10 mg Patient Discharge    ibuprofen (MOTRIN) tablet 400 mg Patient Discharge    LORazepam (ATIVAN) tablet 2 mg Patient Discharge    LORazepam (ATIVAN) tablet 1 mg Patient Discharge    haloperidol lactate (HALDOL) injection 5 mg Patient Discharge    haloperidol (HALDOL) tablet 5 mg Patient Discharge    traZODone (DESYREL) tablet 50 mg Patient Discharge    LORazepam (ATIVAN) injection 2 mg Patient Discharge    LORazepam (ATIVAN) injection 1 mg Patient Discharge    benztropine (COGENTIN) tablet 1-2 mg Patient Discharge    benztropine (COGENTIN) injection 1-2 mg Patient Discharge         Discharged medication:  There are no discharge medications for this patient. Instructions, risks (black box warning), benefits and side effects (EPS, TD, NMS) were discussed in detail prior to discharge. Patient denied any adverse medication side effects prior to discharge. LABS/IMAGING DURING ADMISSION     Results for orders placed or performed during the hospital encounter of 05/25/18   DRUG SCREEN, URINE   Result Value Ref Range    BENZODIAZEPINES NEGATIVE  NEG      BARBITURATES NEGATIVE  NEG      THC (TH-CANNABINOL) POSITIVE (A) NEG      OPIATES NEGATIVE  NEG      PCP(PHENCYCLIDINE) NEGATIVE  NEG      COCAINE NEGATIVE  NEG      AMPHETAMINES NEGATIVE  NEG      METHADONE NEGATIVE  NEG      HDSCOM (NOTE)    ETHYL ALCOHOL   Result Value Ref Range    ALCOHOL(ETHYL),SERUM <3 0 - 3 MG/DL   CBC WITH AUTOMATED DIFF   Result Value Ref Range    WBC 8.5 4.6 - 13.2 K/uL    RBC 4.43 (L) 4.70 - 5.50 M/uL    HGB 13.5 13.0 - 16.0 g/dL    HCT 38.3 36.0 - 48.0 %    MCV 86.5 74.0 - 97.0 FL    MCH 30.5 24.0 - 34.0 PG    MCHC 35.2 31.0 - 37.0 g/dL    RDW 13.5 11.6 - 14.5 %    PLATELET 053 797 - 350 K/uL    MPV 10.3 9.2 - 11.8 FL    NEUTROPHILS 60 40 - 73 %    LYMPHOCYTES 32 21 - 52 %    MONOCYTES 5 3 - 10 %    EOSINOPHILS 3 0 - 5 %    BASOPHILS 0 0 - 2 %    ABS. NEUTROPHILS 5.1 1.8 - 8.0 K/UL    ABS. LYMPHOCYTES 2.7 0.9 - 3.6 K/UL    ABS. MONOCYTES 0.5 0.05 - 1.2 K/UL    ABS. EOSINOPHILS 0.2 0.0 - 0.4 K/UL    ABS.  BASOPHILS 0.0 0.0 - 0.1 K/UL    DF AUTOMATED     METABOLIC PANEL, BASIC   Result Value Ref Range    Sodium 138 136 - 145 mmol/L    Potassium 3.9 3.5 - 5.5 mmol/L Chloride 104 100 - 108 mmol/L    CO2 29 21 - 32 mmol/L    Anion gap 5 3.0 - 18 mmol/L    Glucose 91 74 - 99 mg/dL    BUN 13 7.0 - 18 MG/DL    Creatinine 0.91 0.6 - 1.3 MG/DL    BUN/Creatinine ratio 14 12 - 20      GFR est AA >60 >60 ml/min/1.73m2    GFR est non-AA >60 >60 ml/min/1.73m2    Calcium 8.8 8.5 - 10.1 MG/DL        DISCHARGE MENTAL STATUS EVALUATION     Appearance/Hygiene 19 y.o. AAM with good hygiene. Attitude/Behavior/Social Relatedness Appropriate     Musculoskeletal Gait/Station: appropriate  Tone (flaccid, cogwheeling, spastic): not assessed (speaks with a somewhat clenched jaw). Psychomotor (hyperkinetic, hypokinetic): appropriate   Involuntary movements (tics, dyskinesias, akathisia, stereotypies): none   Speech   Rate, rhythm, volume, fluency and articulation are appropriate   Mood   euthymic   Affect    euthymic   Thought Process Linear and goal directed   Thought Content and Perceptual Disturbances Denies self-injurious behavior (SIB), suicidal ideation (SI), aggressive behavior or homicidal ideation (HI)    Denies auditory and visual hallucinations   Sensorium and Cognition  Grossly intact   Insight  fair   Judgment fair       SUICIDE RISK ASSESSMENT     [] Admission  [x] Discharge     Key Factors:   Current admission precipitated by suicide attempt?   []  Yes     2    [x]  No     1     Suicide Attempt History  [] Past attempts of high lethality    2 []  Past attempts of low lethality    1 [x]  No previous attempts       0   Suicidal Ideation []  Constant suicidal thoughts      2 []  Intermittent or fleeting suicidal  thoughts  1 [x]  Denies current suicidal thoughts    0   Suicide Plan   []  Has plan with actual OR potential access to planned method    2 []  Has plan without access to planned method      1 [x]  No plan            0   Plan Lethality []  Highly lethal plan (Carbon monoxide, gun, hanging, jumping)    2 []  Moderate lethality of plan          1 [x]  Low lethality of plan (biting, head banging, superficial scratching, pillow over face)  0   Safety Plan Agreement  []  Unwilling OR unable to agree due to impaired reality testing   2   []  Patient is ambivalent and/or guarded      1 [x]  Reliably agrees        0   Current Morbid Thoughts (reunion fantasies, preoccupations with death) []  Constantly     2     []  Frequently    1 [x]  Rarely    0   Elopement Risk  []  High risk     2 []  Moderate risk    1 [x]   Low risk    0   Symptoms    []  Hopeless  []  Helpless  []  Anhedonia   []  Guilt/shame  []  Anger/rage  []  Anxiety  []  Insomnia   []  Agitation   []  Impulsivity  []  5-6 symptoms present    2 []  3-4 symptoms present    1  [x]  0-2 symptoms present    0     Scoring Key:  10 or higher = Imminent Risk (consider 1:1)  4 - 9 = Moderate Risk (consider q 15 minute observation)Attended alcohol, tobacco, prescription and other drug psychoeducation group.   0 - 3 = Low Risk (consider q 30 minute observation)    Total Score: 1  ------------------------------------------------------------------------------------------------------------------  PLEASE ADDRESS THE FOLLOWING 5 ISSUES     Physician's Subjective Appraisal of Risk (check one):  []  Patient replies not trustworthy: several non-verbal cues. []  Patient replies questionable: trustworthy: at least 1 non-verbal cue. [x]  Patient replies appear trustworthy. Family History of Suicide?    []  Yes  []  No    Protective measures (select all that apply):  [x]  Successful past responses to stress  []  Spiritual/Caodaism beliefs  [x]  Capacity for reality testing  []  Positive therapeutic relationships  [x]  Social supports/connections  [x]  Positive coping skills  []  Frustration tolerance/optimism  []  Children or pets in the home  []  Sense of responsibility to family  [x]  Agrees to treatment plan and follow up    Others (list):    High Risk Diagnoses (select all that apply):  []  Depression/Bipolar Disorder  []  Dual Diagnosis  [] Cardiovascular Disease  []  Schizophrenia  []  Chronic Pain  []  Epilepsy  []  Cancer  []  Personality Disorder  []  HIV/AIDS  []  Multiple Sclerosis    Dangerousness Assessment (Suicide, homicide, property destruction. ..)    Risk Factors reviewed and risk assessed to be:  [] low  [x] low-moderate  [] moderate   [] moderate-high  [] high     Protection factors reviewed and risk assessed to be:  [] low  [] low-moderate  [] moderate   [] moderate-high  [] high     Response to treatment and risk assessed to be:  [x] low  [] low-moderate  [] moderate   [] moderate-high  [] high     Support reviewed and risk assessed to be:  [] low  [] low-moderate  [] moderate   [] moderate-high  [] high     Acceptance of Discharge and outpatient treatment reviewed and risk assessed to be:    [] low  [] low-moderate  [] moderate   [] moderate-high  [] high   Overall risk assessed to be:  [] low  [] low-moderate  [] moderate   [] moderate-high  [] high     Completion of discharge was greater than 30 minutes. Over 50% of today's discharge was geared towards counseling and coordination of care.           Vladimir Dubois MD  Psychiatry   [facility]

## 2018-05-29 NOTE — DISCHARGE INSTRUCTIONS
BEHAVIORAL HEALTH NURSING DISCHARGE NOTE      The following personal items collected during your admission are returned to you:   Dental Appliance:    Vision: Visual Aid: Contacts, With patient  Hearing Aid:    Jewelry:    Clothing: Clothing: Footwear, Pants, Shirt, Socks, Undergarments  Other Valuables: Other Valuables: Cell Phone, Lighter/matches  Valuables sent to safe:        PATIENT INSTRUCTIONS:      Follow-up with TEMO Locke [facility]. Phone [phone]    Numbers to remember [facility] Desk [facility] [phone] Suicide Prevention [phone](talk)    The discharge information has been reviewed with the patient. The patient verbalized understanding. [employer] Activation    Thank you for requesting access to [employer]. Please follow the instructions below to securely access and download your online medical record. [employer] allows you to send messages to your doctor, view your test results, renew your prescriptions, schedule appointments, and more. How Do I Sign Up? 1. In your internet browser, go to [email or web address]  2. Click on the First Time User? Click Here link in the Sign In box. You will be redirect to the New Member Sign Up page. 3. Enter your [employer] Access Code exactly as it appears below. You will not need to use this code after youve completed the sign-up process. If you do not sign up before the expiration date, you must request a new code. [employer] Access Code: [email or web address]  Expires: 2018 11:12 PM (This is the date your [employer] access code will )    4. Enter the last four digits of your Social Security Number (xxxx) and Date of Birth (mm/dd/yyyy) as indicated and click Submit. You will be taken to the next sign-up page. 5. Create a [employer] ID. This will be your [employer] login ID and cannot be changed, so think of one that is secure and easy to remember. 6. Create a [employer] password. You can change your password at any time. 7. Enter your Password Reset Question and Answer. This can be used at a later time if you forget your password. 8. Enter your e-mail address. You will receive e-mail notification when new information is available in [address] 9. Click Sign Up. You can now view and download portions of your medical record. 10. Click the Download Summary menu link to download a portable copy of your medical information. Additional Information    If you have questions, please visit the Frequently Asked Questions section of the [employer] website at [email or web address] Remember, [employer] is NOT to be used for urgent needs. For medical emergencies, dial 911.       Patient armband removed and shredded

## 2018-05-29 NOTE — BH NOTES
Pt in milieu most of morning, mood and affect euthymic, cooperative, group non-compliant, insight into illness moderate, interaction good, focused on \"getting Sandi here so I can get home, try to do things right\". No behavioral issues during shift, denies intent to harm self/others and A/V hallucinations, involved in no falls this shift - skid resistant footwear utilized and floor kept free of items that could precipitate a fall.

## 2018-05-29 NOTE — BH NOTES
GROUP THERAPY PROGRESS NOTE    Hyacinth Girard was encouraged by staff but refused to participate in Community .

## 2025-03-07 ENCOUNTER — HOSPITAL ENCOUNTER (EMERGENCY)
Facility: HOSPITAL | Age: 25
Discharge: HOME | End: 2025-03-07
Payer: OTHER GOVERNMENT

## 2025-03-07 VITALS
WEIGHT: 177.5 LBS | HEART RATE: 68 BPM | DIASTOLIC BLOOD PRESSURE: 49 MMHG | OXYGEN SATURATION: 100 % | TEMPERATURE: 98 F | HEIGHT: 69 IN | BODY MASS INDEX: 26.29 KG/M2 | RESPIRATION RATE: 14 BRPM | SYSTOLIC BLOOD PRESSURE: 107 MMHG

## 2025-03-07 DIAGNOSIS — M79.674 PAIN OF TOE OF RIGHT FOOT: Primary | ICD-10-CM

## 2025-03-07 PROCEDURE — 99283 EMERGENCY DEPT VISIT LOW MDM: CPT

## 2025-03-07 RX ORDER — SULFAMETHOXAZOLE AND TRIMETHOPRIM 800; 160 MG/1; MG/1
1 TABLET ORAL 2 TIMES DAILY
Qty: 20 TABLET | Refills: 0 | Status: SHIPPED | OUTPATIENT
Start: 2025-03-07 | End: 2025-03-17

## 2025-03-07 NOTE — DISCHARGE INSTRUCTIONS
You should be contacted with an appointment with podiatry. Take antibiotics as directed. Follow up with your primary care provider in 2 days. Return to the emergency department for any increase in symptoms or for any other new or worrisome symptoms.

## 2025-03-07 NOTE — ED PROVIDER NOTES
Encounter Date: 3/7/2025       History     Chief Complaint   Patient presents with    Toe Pain     Presents to ED for complaints of right great toe pain.  Patient reports that nail split and has been painful and draining.     25-year-old male presents to the emergency department to be evaluated for right great toenail pain.  He said he noticed some purulent drainage from this morning.  He said he is not sure if he broke off a piece of his toenail a few days ago when it began hurting.    The history is provided by the patient.   Toe Pain  This is a new problem. The current episode started more than 2 days ago. Pertinent negatives include no chest pain, no abdominal pain, no headaches and no shortness of breath.     Review of patient's allergies indicates:  No Known Allergies  History reviewed. No pertinent past medical history.  History reviewed. No pertinent surgical history.  No family history on file.  Social History[1]  Review of Systems   Constitutional:  Negative for chills and fever.   Respiratory:  Negative for shortness of breath.    Cardiovascular:  Negative for chest pain.   Gastrointestinal:  Negative for abdominal pain.   Neurological:  Negative for headaches.   All other systems reviewed and are negative.      Physical Exam     Initial Vitals [03/07/25 0900]   BP Pulse Resp Temp SpO2   133/77 68 16 98.2 °F (36.8 °C) 99 %      MAP       --         Physical Exam    Vitals reviewed.  Constitutional: He appears well-developed and well-nourished.   Cardiovascular:            Pulses:       Dorsalis pedis pulses are 3+ on the right side.     Skin: Skin is warm and dry. Capillary refill takes less than 2 seconds.   Minimal amount of purulent drainage from the medial aspect of the right great toe   Psychiatric: He has a normal mood and affect.         Medical Screening Exam   See Full Note    ED Course   Procedures  Labs Reviewed - No data to display       Imaging Results    None          Medications - No data  to display  Medical Decision Making  25-year-old male presents to the emergency department to be evaluated for right great toenail pain.  He said he noticed some purulent drainage from this morning.  He said he is not sure if he broke off a piece of his toenail a few days ago when it began hurting.  Diagnosis: Toe pain  Referred to Podiatry  Prescribed Bactrim    Risk  Prescription drug management.                                      Clinical Impression:   Final diagnoses:  [M79.674] Pain of toe of right foot (Primary)        ED Disposition Condition    Discharge Stable          ED Prescriptions       Medication Sig Dispense Start Date End Date Auth. Provider    sulfamethoxazole-trimethoprim 800-160mg (BACTRIM DS) 800-160 mg Tab Take 1 tablet by mouth 2 (two) times daily. for 10 days 20 tablet 3/7/2025 3/17/2025 Melanie Gage FNP          Follow-up Information    None            [1]   Social History  Tobacco Use    Smoking status: Every Day     Types: Vaping with nicotine    Smokeless tobacco: Never   Substance Use Topics    Alcohol use: Not Currently    Drug use: Never        Melanie Gage FNP  03/07/25 0928